# Patient Record
Sex: FEMALE | Race: WHITE | NOT HISPANIC OR LATINO | Employment: UNEMPLOYED | URBAN - NONMETROPOLITAN AREA
[De-identification: names, ages, dates, MRNs, and addresses within clinical notes are randomized per-mention and may not be internally consistent; named-entity substitution may affect disease eponyms.]

---

## 2023-09-04 ENCOUNTER — HOSPITAL ENCOUNTER (EMERGENCY)
Facility: HOSPITAL | Age: 23
Discharge: HOME | End: 2023-09-04
Admitting: STUDENT IN AN ORGANIZED HEALTH CARE EDUCATION/TRAINING PROGRAM
Payer: MEDICAID

## 2023-09-04 VITALS
DIASTOLIC BLOOD PRESSURE: 56 MMHG | RESPIRATION RATE: 16 BRPM | HEIGHT: 60 IN | TEMPERATURE: 98.3 F | SYSTOLIC BLOOD PRESSURE: 116 MMHG | OXYGEN SATURATION: 95 % | HEART RATE: 65 BPM | BODY MASS INDEX: 24.35 KG/M2 | WEIGHT: 124 LBS

## 2023-09-04 DIAGNOSIS — N63.22 BREAST LUMP ON LEFT SIDE AT 11 O'CLOCK POSITION: Primary | ICD-10-CM

## 2023-09-04 PROCEDURE — 99282 EMERGENCY DEPT VISIT SF MDM: CPT

## 2023-09-04 PROCEDURE — 99283 EMERGENCY DEPT VISIT LOW MDM: CPT

## 2023-09-05 NOTE — ED PROVIDER NOTES
Subjective   History of Present Illness  Patient is a 23-year-old female that presents to the ER with a lump on her upper chest area, appears to be above her breast tissue below her clavicle area.  Patient denies fever, denies drainage, denies open area.  Denies any type of injury patient states she did breast-feed her child for short period of time but she has not for over a couple of months.  Patient states he lump started approximately 1 month ago she believed it was from picking up her children repeatedly and that is when she has the most pain.  Patient states there is pain when she touches it.      Review of Systems   Skin:         Lump to left side of chest   All other systems reviewed and are negative.    History reviewed. No pertinent past medical history.    No Known Allergies    Past Surgical History:   Procedure Laterality Date     SECTION      CHOLECYSTECTOMY         History reviewed. No pertinent family history.    Social History     Socioeconomic History    Marital status: Single   Tobacco Use    Smoking status: Never    Smokeless tobacco: Never   Substance and Sexual Activity    Alcohol use: Not Currently           Objective   Physical Exam  Vitals and nursing note reviewed.   Constitutional:       General: She is not in acute distress.     Appearance: Normal appearance. She is not toxic-appearing or diaphoretic.   HENT:      Head: Normocephalic and atraumatic.      Right Ear: External ear normal.      Left Ear: External ear normal.      Nose: Nose normal.      Mouth/Throat:      Mouth: Mucous membranes are moist.   Eyes:      General:         Right eye: No discharge.         Left eye: No discharge.      Extraocular Movements: Extraocular movements intact.      Conjunctiva/sclera: Conjunctivae normal.   Cardiovascular:      Rate and Rhythm: Normal rate.   Pulmonary:      Effort: Pulmonary effort is normal. No respiratory distress.      Breath sounds: No rhonchi.   Abdominal:      General:  Abdomen is flat.      Tenderness: There is no abdominal tenderness. There is no guarding or rebound.   Musculoskeletal:         General: No deformity or signs of injury.      Cervical back: Normal range of motion.   Skin:     General: Skin is warm.      Coloration: Skin is not jaundiced.      Comments: This soft small raised area superior/midline to left breast approximately.  Negative for drainage negative for open area negative for erythema negative for warmth to area.  Very small in nature   Neurological:      Mental Status: She is alert and oriented to person, place, and time. Mental status is at baseline.   Psychiatric:         Mood and Affect: Mood normal.         Behavior: Behavior normal.         Thought Content: Thought content normal.         Judgment: Judgment normal.       Procedures           ED Course  ED Course as of 09/07/23 1550   Mon Sep 04, 2023   2303 Bedside ultrasound completed by Dr. Oshea, no abscess seen at this time on ultrasound.  Patient told to discharge home and follow-up with primary care for further evaluation [HS]      ED Course User Index  [HS] Natalie Parra APRN                                           Medical Decision Making  Adrian Fernandez is a 23 y.o. female who presents to the ED for small lump on breast.     Patient was non-toxic appearing on arrival. No acute distress was noted. Past medical history, surgical history, and medication regimen reviewed.     Vital signs remained hemodynamically stable.       Patient's presentation raises suspicion for differentials including, but not limited to, breast abscess, cellulitis, wound.     Given this Dr. Oshea performed bedside ultrasound and discussed findings with patient and her friend.    Please refer to ED course for labs and imaging results.    No medication was provided to patient.  It was explained that this did not appear to be acute finding and patient would need to follow-up with her primary care provider who could  order more in-depth test including mammograms, it was explained that it did not appear to be infectious and there would be nothing to be drained at this time.  Patient was told to return back to the ER if his symptoms do worsen.  Patient is agreeable to plan on re-evaluation, patient remained hemodynamically stable.       Given findings described above, patient's presentation is likely consistent with lump on breast. I have a low suspicion for cellulitis, clogged duct, folliculitis, wound after ultrasound was completed at bedside by Dr. Oshea at this point in their ED course.      Discussion with consultants: I discussed this case with Dr. Oshea, who seen patient at bedside, and is agreeable for disposition home at this time    Shared decision making: Patient understands that she does need to follow-up with her primary care provider because this is an abnormal finding but it is nothing that needs to be treated in the ER tonight because it does not appear to be anything acute.      I went over the workup and results so far with everyone in the room. I told them that patient would be discharged home and need to follow-up with primary care provider.     I answered all the questions regarding the emergency department evaluation, diagnosis, and treatment plan in plain and simple language that was understandable.     I said that there is always some diagnostic uncertainty in the ER and went over the fact that the symptoms may change or new symptoms may reveal themselves after being discharged.     Because of this, I said that it is important that Laycie follows up, by calling as soon as possible to set up an appointment, with the primary care doctor within the next few days or as soon as reasonably possible so that the symptoms can be re-evaluated for improvement or for any other questions.            Problems Addressed:  Breast lump on left side at 11 o'clock position: acute illness or injury        Final diagnoses:    Breast lump on left side at 11 o'clock position       ED Disposition  ED Disposition       ED Disposition   Discharge    Condition   Stable    Comment   --               PATIENT CONNECTION - [facility] [id number]  [phone]  Schedule an appointment as soon as possible for a visit       [facility] EMERGENCY DEPARTMENT  [address] [place] [address] [id number] [phone]    If symptoms worsen         Medication List      No changes were made to your prescriptions during this visit.            Natalie Parra, APRN  09/07/23 1553

## 2023-09-05 NOTE — DISCHARGE INSTRUCTIONS
You will need to follow-up with your primary care provider for further evaluation including the ordering of a mammogram.  I have provided a list of primary care providers if you do not have one please utilize the patient connection portal to do so.  At this time it does not appear that you have anything emergent that will need to be taken care of tonight.  If any symptoms do change or new ones occur please return back to the ER for further evaluation.